# Patient Record
Sex: FEMALE | Race: OTHER | HISPANIC OR LATINO | URBAN - METROPOLITAN AREA
[De-identification: names, ages, dates, MRNs, and addresses within clinical notes are randomized per-mention and may not be internally consistent; named-entity substitution may affect disease eponyms.]

---

## 2020-09-15 ENCOUNTER — INPATIENT (INPATIENT)
Facility: HOSPITAL | Age: 35
LOS: 2 days | Discharge: HOME | DRG: 872 | End: 2020-09-18
Attending: INTERNAL MEDICINE | Admitting: INTERNAL MEDICINE
Payer: MEDICAID

## 2020-09-15 VITALS
DIASTOLIC BLOOD PRESSURE: 74 MMHG | WEIGHT: 143.08 LBS | RESPIRATION RATE: 20 BRPM | HEIGHT: 61 IN | OXYGEN SATURATION: 95 % | HEART RATE: 118 BPM | SYSTOLIC BLOOD PRESSURE: 116 MMHG | TEMPERATURE: 100 F

## 2020-09-15 DIAGNOSIS — N12 TUBULO-INTERSTITIAL NEPHRITIS, NOT SPECIFIED AS ACUTE OR CHRONIC: ICD-10-CM

## 2020-09-15 LAB
ALBUMIN SERPL ELPH-MCNC: 4.1 G/DL — SIGNIFICANT CHANGE UP (ref 3.3–5)
ALP SERPL-CCNC: 73 U/L — SIGNIFICANT CHANGE UP (ref 40–120)
ALT FLD-CCNC: 22 U/L — SIGNIFICANT CHANGE UP (ref 10–45)
ANION GAP SERPL CALC-SCNC: 14 MMOL/L — SIGNIFICANT CHANGE UP (ref 5–17)
ANISOCYTOSIS BLD QL: SLIGHT — SIGNIFICANT CHANGE UP
APPEARANCE UR: ABNORMAL
AST SERPL-CCNC: 22 U/L — SIGNIFICANT CHANGE UP (ref 10–40)
BACTERIA # UR AUTO: ABNORMAL
BASE EXCESS BLDV CALC-SCNC: -1.1 MMOL/L — SIGNIFICANT CHANGE UP (ref -2–2)
BASE EXCESS BLDV CALC-SCNC: 1.4 MMOL/L — SIGNIFICANT CHANGE UP (ref -2–2)
BASOPHILS # BLD AUTO: 0 K/UL — SIGNIFICANT CHANGE UP (ref 0–0.2)
BASOPHILS NFR BLD AUTO: 0 % — SIGNIFICANT CHANGE UP (ref 0–2)
BILIRUB SERPL-MCNC: 0.6 MG/DL — SIGNIFICANT CHANGE UP (ref 0.2–1.2)
BILIRUB UR-MCNC: NEGATIVE — SIGNIFICANT CHANGE UP
BUN SERPL-MCNC: 9 MG/DL — SIGNIFICANT CHANGE UP (ref 7–23)
CA-I SERPL-SCNC: 1.1 MMOL/L — LOW (ref 1.12–1.3)
CA-I SERPL-SCNC: 1.16 MMOL/L — SIGNIFICANT CHANGE UP (ref 1.12–1.3)
CALCIUM SERPL-MCNC: 9.8 MG/DL — SIGNIFICANT CHANGE UP (ref 8.4–10.5)
CHLORIDE BLDV-SCNC: 102 MMOL/L — SIGNIFICANT CHANGE UP (ref 96–108)
CHLORIDE BLDV-SCNC: 107 MMOL/L — SIGNIFICANT CHANGE UP (ref 96–108)
CHLORIDE SERPL-SCNC: 96 MMOL/L — SIGNIFICANT CHANGE UP (ref 96–108)
CO2 BLDV-SCNC: 24 MMOL/L — SIGNIFICANT CHANGE UP (ref 22–30)
CO2 BLDV-SCNC: 28 MMOL/L — SIGNIFICANT CHANGE UP (ref 22–30)
CO2 SERPL-SCNC: 24 MMOL/L — SIGNIFICANT CHANGE UP (ref 22–31)
COLOR SPEC: YELLOW — SIGNIFICANT CHANGE UP
CREAT SERPL-MCNC: 0.87 MG/DL — SIGNIFICANT CHANGE UP (ref 0.5–1.3)
DACRYOCYTES BLD QL SMEAR: SLIGHT — SIGNIFICANT CHANGE UP
DIFF PNL FLD: ABNORMAL
ELLIPTOCYTES BLD QL SMEAR: SLIGHT — SIGNIFICANT CHANGE UP
EOSINOPHIL # BLD AUTO: 0 K/UL — SIGNIFICANT CHANGE UP (ref 0–0.5)
EOSINOPHIL NFR BLD AUTO: 0 % — SIGNIFICANT CHANGE UP (ref 0–6)
EPI CELLS # UR: 2 /HPF — SIGNIFICANT CHANGE UP
GAS PNL BLDV: 133 MMOL/L — LOW (ref 135–145)
GAS PNL BLDV: 134 MMOL/L — LOW (ref 135–145)
GAS PNL BLDV: SIGNIFICANT CHANGE UP
GIANT PLATELETS BLD QL SMEAR: PRESENT — SIGNIFICANT CHANGE UP
GLUCOSE BLDV-MCNC: 94 MG/DL — SIGNIFICANT CHANGE UP (ref 70–99)
GLUCOSE BLDV-MCNC: 94 MG/DL — SIGNIFICANT CHANGE UP (ref 70–99)
GLUCOSE SERPL-MCNC: 100 MG/DL — HIGH (ref 70–99)
GLUCOSE UR QL: NEGATIVE — SIGNIFICANT CHANGE UP
HCG UR QL: NEGATIVE — SIGNIFICANT CHANGE UP
HCO3 BLDV-SCNC: 23 MMOL/L — SIGNIFICANT CHANGE UP (ref 21–29)
HCO3 BLDV-SCNC: 26 MMOL/L — SIGNIFICANT CHANGE UP (ref 21–29)
HCT VFR BLD CALC: 44.3 % — SIGNIFICANT CHANGE UP (ref 34.5–45)
HCT VFR BLDA CALC: 42 % — SIGNIFICANT CHANGE UP (ref 39–50)
HCT VFR BLDA CALC: 45 % — SIGNIFICANT CHANGE UP (ref 39–50)
HGB BLD CALC-MCNC: 13.7 G/DL — SIGNIFICANT CHANGE UP (ref 11.5–15.5)
HGB BLD CALC-MCNC: 14.5 G/DL — SIGNIFICANT CHANGE UP (ref 11.5–15.5)
HGB BLD-MCNC: 14.5 G/DL — SIGNIFICANT CHANGE UP (ref 11.5–15.5)
HIV 1 & 2 AB SERPL IA.RAPID: SIGNIFICANT CHANGE UP
HYALINE CASTS # UR AUTO: 0 /LPF — SIGNIFICANT CHANGE UP (ref 0–2)
KETONES UR-MCNC: ABNORMAL
LACTATE BLDV-MCNC: 1.9 MMOL/L — SIGNIFICANT CHANGE UP (ref 0.7–2)
LACTATE BLDV-MCNC: 2.5 MMOL/L — HIGH (ref 0.7–2)
LEUKOCYTE ESTERASE UR-ACNC: ABNORMAL
LIDOCAIN IGE QN: 38 U/L — SIGNIFICANT CHANGE UP (ref 7–60)
LYMPHOCYTES # BLD AUTO: 2.17 K/UL — SIGNIFICANT CHANGE UP (ref 1–3.3)
LYMPHOCYTES # BLD AUTO: 8.7 % — LOW (ref 13–44)
MACROCYTES BLD QL: SLIGHT — SIGNIFICANT CHANGE UP
MANUAL SMEAR VERIFICATION: SIGNIFICANT CHANGE UP
MCHC RBC-ENTMCNC: 28.9 PG — SIGNIFICANT CHANGE UP (ref 27–34)
MCHC RBC-ENTMCNC: 32.7 GM/DL — SIGNIFICANT CHANGE UP (ref 32–36)
MCV RBC AUTO: 88.4 FL — SIGNIFICANT CHANGE UP (ref 80–100)
MONOCYTES # BLD AUTO: 0.87 K/UL — SIGNIFICANT CHANGE UP (ref 0–0.9)
MONOCYTES NFR BLD AUTO: 3.5 % — SIGNIFICANT CHANGE UP (ref 2–14)
NEUTROPHILS # BLD AUTO: 21.88 K/UL — HIGH (ref 1.8–7.4)
NEUTROPHILS NFR BLD AUTO: 86.1 % — HIGH (ref 43–77)
NEUTS BAND # BLD: 1.7 % — SIGNIFICANT CHANGE UP (ref 0–8)
NITRITE UR-MCNC: POSITIVE
OTHER CELLS CSF MANUAL: 11 ML/DL — LOW (ref 16–22)
PCO2 BLDV: 38 MMHG — SIGNIFICANT CHANGE UP (ref 35–50)
PCO2 BLDV: 45 MMHG — SIGNIFICANT CHANGE UP (ref 35–50)
PH BLDV: 7.38 — SIGNIFICANT CHANGE UP (ref 7.35–7.45)
PH BLDV: 7.39 — SIGNIFICANT CHANGE UP (ref 7.35–7.45)
PH UR: 6.5 — SIGNIFICANT CHANGE UP (ref 5–8)
PLAT MORPH BLD: ABNORMAL
PLATELET # BLD AUTO: 275 K/UL — SIGNIFICANT CHANGE UP (ref 150–400)
PO2 BLDV: 20 MMHG — LOW (ref 25–45)
PO2 BLDV: 33 MMHG — SIGNIFICANT CHANGE UP (ref 25–45)
POIKILOCYTOSIS BLD QL AUTO: SLIGHT — SIGNIFICANT CHANGE UP
POLYCHROMASIA BLD QL SMEAR: SLIGHT — SIGNIFICANT CHANGE UP
POTASSIUM BLDV-SCNC: 3.3 MMOL/L — LOW (ref 3.5–5.3)
POTASSIUM BLDV-SCNC: 3.4 MMOL/L — LOW (ref 3.5–5.3)
POTASSIUM SERPL-MCNC: 3.4 MMOL/L — LOW (ref 3.5–5.3)
POTASSIUM SERPL-SCNC: 3.4 MMOL/L — LOW (ref 3.5–5.3)
PROT SERPL-MCNC: 7.7 G/DL — SIGNIFICANT CHANGE UP (ref 6–8.3)
PROT UR-MCNC: ABNORMAL
RBC # BLD: 5.01 M/UL — SIGNIFICANT CHANGE UP (ref 3.8–5.2)
RBC # FLD: 13.1 % — SIGNIFICANT CHANGE UP (ref 10.3–14.5)
RBC BLD AUTO: ABNORMAL
RBC CASTS # UR COMP ASSIST: 15 /HPF — HIGH (ref 0–4)
SAO2 % BLDV: 28 % — LOW (ref 67–88)
SAO2 % BLDV: 61 % — LOW (ref 67–88)
SARS-COV-2 RNA SPEC QL NAA+PROBE: SIGNIFICANT CHANGE UP
SODIUM SERPL-SCNC: 134 MMOL/L — LOW (ref 135–145)
SP GR SPEC: 1.02 — SIGNIFICANT CHANGE UP (ref 1.01–1.02)
UROBILINOGEN FLD QL: ABNORMAL
WBC # BLD: 24.92 K/UL — HIGH (ref 3.8–10.5)
WBC # FLD AUTO: 24.92 K/UL — HIGH (ref 3.8–10.5)
WBC UR QL: 83 /HPF — HIGH (ref 0–5)

## 2020-09-15 PROCEDURE — 74177 CT ABD & PELVIS W/CONTRAST: CPT | Mod: 26

## 2020-09-15 PROCEDURE — 93010 ELECTROCARDIOGRAM REPORT: CPT

## 2020-09-15 PROCEDURE — 71045 X-RAY EXAM CHEST 1 VIEW: CPT | Mod: 26

## 2020-09-15 PROCEDURE — 99285 EMERGENCY DEPT VISIT HI MDM: CPT

## 2020-09-15 RX ORDER — ACETAMINOPHEN 500 MG
2 TABLET ORAL
Qty: 0 | Refills: 0 | DISCHARGE

## 2020-09-15 RX ORDER — ACETAMINOPHEN 500 MG
975 TABLET ORAL ONCE
Refills: 0 | Status: COMPLETED | OUTPATIENT
Start: 2020-09-15 | End: 2020-09-15

## 2020-09-15 RX ORDER — POTASSIUM CHLORIDE 20 MEQ
10 PACKET (EA) ORAL ONCE
Refills: 0 | Status: COMPLETED | OUTPATIENT
Start: 2020-09-15 | End: 2020-09-15

## 2020-09-15 RX ORDER — SODIUM CHLORIDE 9 MG/ML
1000 INJECTION INTRAMUSCULAR; INTRAVENOUS; SUBCUTANEOUS
Refills: 0 | Status: DISCONTINUED | OUTPATIENT
Start: 2020-09-15 | End: 2020-09-16

## 2020-09-15 RX ORDER — SODIUM CHLORIDE 9 MG/ML
1000 INJECTION INTRAMUSCULAR; INTRAVENOUS; SUBCUTANEOUS ONCE
Refills: 0 | Status: COMPLETED | OUTPATIENT
Start: 2020-09-15 | End: 2020-09-15

## 2020-09-15 RX ORDER — NORETHINDRONE AND ETHINYL ESTRADIOL 0.4-0.035
1 KIT ORAL
Qty: 0 | Refills: 0 | DISCHARGE

## 2020-09-15 RX ORDER — KETOROLAC TROMETHAMINE 30 MG/ML
15 SYRINGE (ML) INJECTION ONCE
Refills: 0 | Status: DISCONTINUED | OUTPATIENT
Start: 2020-09-15 | End: 2020-09-15

## 2020-09-15 RX ORDER — PIPERACILLIN AND TAZOBACTAM 4; .5 G/20ML; G/20ML
3.38 INJECTION, POWDER, LYOPHILIZED, FOR SOLUTION INTRAVENOUS ONCE
Refills: 0 | Status: COMPLETED | OUTPATIENT
Start: 2020-09-15 | End: 2020-09-15

## 2020-09-15 RX ORDER — CEFTRIAXONE 500 MG/1
1000 INJECTION, POWDER, FOR SOLUTION INTRAMUSCULAR; INTRAVENOUS EVERY 24 HOURS
Refills: 0 | Status: DISCONTINUED | OUTPATIENT
Start: 2020-09-15 | End: 2020-09-18

## 2020-09-15 RX ADMIN — SODIUM CHLORIDE 100 MILLILITER(S): 9 INJECTION INTRAMUSCULAR; INTRAVENOUS; SUBCUTANEOUS at 21:41

## 2020-09-15 RX ADMIN — SODIUM CHLORIDE 2000 MILLILITER(S): 9 INJECTION INTRAMUSCULAR; INTRAVENOUS; SUBCUTANEOUS at 13:13

## 2020-09-15 RX ADMIN — Medication 10 MILLIEQUIVALENT(S): at 21:41

## 2020-09-15 RX ADMIN — SODIUM CHLORIDE 1000 MILLILITER(S): 9 INJECTION INTRAMUSCULAR; INTRAVENOUS; SUBCUTANEOUS at 12:23

## 2020-09-15 RX ADMIN — CEFTRIAXONE 100 MILLIGRAM(S): 500 INJECTION, POWDER, FOR SOLUTION INTRAMUSCULAR; INTRAVENOUS at 23:02

## 2020-09-15 RX ADMIN — Medication 975 MILLIGRAM(S): at 13:13

## 2020-09-15 RX ADMIN — Medication 975 MILLIGRAM(S): at 13:50

## 2020-09-15 RX ADMIN — Medication 15 MILLIGRAM(S): at 16:45

## 2020-09-15 RX ADMIN — PIPERACILLIN AND TAZOBACTAM 200 GRAM(S): 4; .5 INJECTION, POWDER, LYOPHILIZED, FOR SOLUTION INTRAVENOUS at 12:23

## 2020-09-15 NOTE — ED PROVIDER NOTE - OBJECTIVE STATEMENT
35F no pmhx p/w RLQ and fever x 2 days, had initial right flank pain 3 days ago, but then developed chills as well. No dysuria. No vaginal discharge. LMP 8/17. Sexually active with 1 partner no barrier contraception, denies risk of STDs. Takes OCPs. No cough/ URI symptoms. Vomited x 1 yesterday. Had constipation but took laxative and was able to have bowel movement today. No headache. No rash. 35F no pmhx p/w RLQ and fever x 2 days, had initial right flank pain 3 days ago, but then developed chills 2 days ago. No dysuria. No vaginal discharge. LMP 8/17. Sexually active with 1 partner no barrier contraception, denies risk of STDs. Takes OCPs. No cough/ URI symptoms. Vomited x 1 yesterday. Had constipation but took laxative and was able to have bowel movement today. No headache. No rash.

## 2020-09-15 NOTE — ED PROVIDER NOTE - PHYSICAL EXAMINATION
Gen: AAOx3, NAD  Head: NCAT  ENT: Airway patent, moist mucous membranes   Cardiac: tachycardia without murmurs   Respiratory: Lungs CTA B/L  Gastrointestinal:  Abdomen soft, mod RLQ TTP, nondistended, no rebound  : + CVA tenderness on Right   MSK: No gross abnormalities, FROM of all four extremities, no edema  HEME: Extremities warm, pulses intact and symmetrical in all four extremities  Skin: No rashes, no lesions  Neuro: No gross neurologic deficits, Gen: AAOx3, NAD  Head: NCAT  ENT: Airway patent, moist mucous membranes   Cardiac: tachycardia without murmurs   Respiratory: Lungs CTA B/L  Gastrointestinal:  Abdomen soft, mod RLQ TTP, nondistended, no rebound  : + CVA tenderness on Right; pelvic exam chaperoned by Lanette Trinidad RN - normal vaginal discharge, scant clear fluid, no Cervical motion tenderness, no adnexal ttp.   MSK: No gross abnormalities, FROM of all four extremities, no edema  HEME: Extremities warm, pulses intact and symmetrical in all four extremities  Skin: No rashes, no lesions  Neuro: No gross neurologic deficits, Gen: AAOx3, NAD  Head: NCAT  ENT: Airway patent, moist mucous membranes   Cardiac: tachycardia without murmurs   Respiratory: Lungs CTA B/L  Gastrointestinal:  Abdomen soft, mod RLQ TTP, nondistended, no rebound  : + CVA tenderness on Right; pelvic exam chaperoned by Lanette Trinidad RN - normal vaginal discharge, scant clear fluid, no Cervical motion tenderness, no adnexal ttp.   MSK: No gross abnormalities, FROM of all four extremities, no edema  HEME: Extremities warm, pulses intact and symmetrical in all four extremities  Skin: No rashes, no lesions  Neuro: No gross neurologic deficits, normal gait

## 2020-09-15 NOTE — CHART NOTE - NSCHARTNOTEFT_GEN_A_CORE
Medicine NP Sepsis reevaluation note    Vital Signs Last 24 Hrs  T(C): 36.9 (15 Sep 2020 19:00), Max: 39.1 (15 Sep 2020 16:16)  T(F): 98.4 (15 Sep 2020 19:00), Max: 102.4 (15 Sep 2020 16:16)  HR: 115 (15 Sep 2020 19:00) (115 - 125)  BP: 103/68 (15 Sep 2020 19:00) (103/68 - 133/85)  BP(mean): --  RR: 18 (15 Sep 2020 19:00) (16 - 20)  SpO2: 98% (15 Sep 2020 19:00) (95% - 100%)    		  LUNGS:  CTA B/L  HEART: RRR, tachycardiac  CAPILLARY REFiLL:  	Fingers: [ x ] less than 2 seconds                                            Toes: [x  ]  less than 2 seconds  PERIPHERAL PULSES:  Radial: [  ] Palpable  [  x]  non-palpable                                         Dorsalis Pedis: [  x] Palpable  [  ] non-palpable                                         Posterior Tibial: [ x ] Palpable  [  ] non-palpable                                          Other:  SKIN:   [  ]  Diaphoretic  [  ]  mottling  [  ]  Cold extremities  [ x ]  Warm [x  ]  Dry                      Other:                            14.5   24.92 )-----------( 275      ( 15 Sep 2020 12:38 )             44.3       Blood Gas Venous - Lactate (09.15.20 @ 16:10)    Blood Gas Venous - Lactate: 1.9 mmoL/L  A/P     35F no pmhx p/w RLQ and fever x 2 days, had initial right flank pain 3 days ago, but then developed chills 2 days ago. Admitted with Sepsis sec to [facility]  s/p 2L NS iv bolus in the ED  S/P Zosyn in the ED  Afebrile now, not hypotensive, Hr in low 100;s to 110;s  C/W NS@ 100 ml/hr  Vitals q4 hourly  H&P pending  Will continue to monitor    Eric juarez [facility] [place]  [id number]

## 2020-09-15 NOTE — ED ADULT NURSE NOTE - OBJECTIVE STATEMENT
36 y/o Female no significant pmh presenting to ED c/o RLQ pain associated with subjective fevers and chills x the passed 2 days. pt states that she began having RLQ pain 3 days ago that has not subsided and is now experiencing chills and was febrile this am to 101F orally. states she last took Tylenol at 2 am this morning. oral temp of 100.1F orally upon assessment. HR tachy to 118, bp stable. denies any weakness, dizziness, nausea, vomiting, urinary s/s, body aches, sick contacts, or recent travel. labs and line obtained with blood cultures x 2, safety maintained, call bell at the bedside and within reach. pt pending ct scan of abdomen.

## 2020-09-15 NOTE — ED PROVIDER NOTE - NS ED ROS FT
Gen: + fever and decreased appetite  Eyes: No eye irritation or discharge  ENT: No ear pain, congestion, sore throat  Resp: No cough or trouble breathing  Cardiovascular: No chest pain or palpitation  Gastroenteric: + vomiting, + abd pain   :  No change in urine output; no dysuria, + rigth flank pain   MS: No joint or muscle pain  Skin: No rashes  Neuro: No headache; no abnormal movements  Remainder negative, except as per the HPI Gen: + fever and decreased appetite  Eyes: No eye irritation or discharge  ENT: No ear pain, congestion, sore throat  Resp: No cough or trouble breathing  Cardiovascular: No chest pain or palpitation  Gastroenteric: + vomiting, + abd pain   :  No change in urine output; no dysuria, + right flank pain   MS: No joint or muscle pain  Skin: No rashes  Neuro: No headache; no abnormal movements  Remainder negative, except as per the HPI

## 2020-09-15 NOTE — H&P ADULT - NSHPLABSRESULTS_GEN_ALL_CORE
14.5   24.92 )-----------( 275      ( 15 Sep 2020 12:38 )             44.3     09-15    134<L>  |  96  |  9   ----------------------------<  100<H>  3.4<L>   |  24  |  0.87    Ca    9.8      15 Sep 2020 12:38    TPro  7.7  /  Alb  4.1  /  TBili  0.6  /  DBili  x   /  AST  22  /  ALT  22  /  AlkPhos  73  09-15

## 2020-09-15 NOTE — ED PROVIDER NOTE - PROGRESS NOTE DETAILS
Saqib SPAULDING PGY-1: pt states that her pain has improved. Pt resting comfortably in bed. pt reassessed, still tachycardic, informed of admission for sepsis 2/2 to pyelo, extremities well perfused, cap refill < 2 sec, plan for admission for abnormal vitals in setting of sepsis, well appearing though, stable for floor. Spoke to pt's pcp's and they said they do not admit to anyone in particular. Fu DO: pt reassessed, s/p 30 cc/kg IBW-  still tachycardic, started maintenance IVF. Pt informed of admission for sepsis 2/2 to pyelo, extremities well perfused, cap refill < 2 sec, plan for admission for abnormal vitals in setting of sepsis, clinically well appearing, stable for floor.

## 2020-09-15 NOTE — ED PROVIDER NOTE - CLINICAL SUMMARY MEDICAL DECISION MAKING FREE TEXT BOX
Fu DO: 35F tachycardia, fever, RLQ abd pain reproducible on exam, concern for pyelo vs appendicitis, less likely septic stone, pt overall well appearing despite abnormal vitals, will dose empiric abx and gentle fluids as pt appears hydrated, ct a/p to eval for intraabd infection, pelvic exam, no chest pain or URI symptoms, no signs of meningismus. Fu DO: 35F tachycardia, fever, RLQ abd pain reproducible on exam, concern for pyelo vs appendicitis, less likely septic stone, pt overall well appearing despite abnormal vitals, will dose empiric abx and gentle fluids as pt appears dehydrated, if leukocytosis or lactate on labs then will admin 30 cc/kg per sepsis bundle, will obtain ct a/p to eval for intraabd infection. Pelvic exam wnl. No chest pain or URI symptoms, no signs of meningismus. screening ekg and cxr to eval for pna though less likely.

## 2020-09-15 NOTE — ED PROVIDER NOTE - CARE PLAN
Principal Discharge DX:	Pyelonephritis  Secondary Diagnosis:	Sepsis, due to unspecified organism, unspecified whether acute organ dysfunction present

## 2020-09-15 NOTE — H&P ADULT - HISTORY OF PRESENT ILLNESS
35F no pmhx p/w RLQ and fever x 2 days, had initial right flank pain 3 days ago, but then developed chills 2 days ago. No dysuria. No vaginal discharge. LMP 8/17. Sexually active with 1 partner no barrier contraception, denies risk of STDs. Takes OCPs. No cough/ URI symptoms. Vomited x 1 yesterday. Had constipation but took laxative and was able to have bowel movement today. No headache. No rash.

## 2020-09-15 NOTE — H&P ADULT - NSHPPHYSICALEXAM_GEN_ALL_CORE
pt. seen and examined, feeling better     Vital Signs Last 24 Hrs  T(C): 38.2 (16 Sep 2020 01:52), Max: 39.1 (15 Sep 2020 16:16)  T(F): 100.7 (16 Sep 2020 01:52), Max: 102.4 (15 Sep 2020 16:16)  HR: 111 (16 Sep 2020 01:52) (111 - 125)  BP: 101/67 (16 Sep 2020 01:52) (101/67 - 133/85)  BP(mean): --  RR: 18 (16 Sep 2020 01:52) (16 - 20)  SpO2: 100% (16 Sep 2020 01:52) (95% - 100%)    heent: nc/at, no pallor  neck: supple, no JVD  lungs: B/L clear, no w/r/r  heart: s1s2 nml  abd: soft, NABS, NT/ND  ext: no e/c/c, pulses 2+  neuro: aaox3 , no focal deficit

## 2020-09-15 NOTE — ED ADULT NURSE REASSESSMENT NOTE - NS ED NURSE REASSESS COMMENT FT1
pt admitted to hospital for pyelo associated with tachycardia despite fluids. pt administered toradol 15 mg IVP for fever of 102.4F orally upon reassessment, pt denies feeling febrile. denies any chills, weakness, cp, sob, abd pain, or any other complaints of pain or discomfort. pts temp resolved with toradol 15mg IV and pt currently afebrile with oral temp pf 97.8F, pt remains tachy despite 2 L of NS, tachy to 114. repeat lactate improved with repeat result of 1.9 from 2.5. report given to 6 lawrence RN, pt pending transport. safety maintained.

## 2020-09-15 NOTE — H&P ADULT - ATTENDING COMMENTS
advance care planning : pt. remain full code. d/w patinet regarding CPR, intubation , chest compression, she agrees for everything . time spend 15 min

## 2020-09-16 DIAGNOSIS — A41.9 SEPSIS, UNSPECIFIED ORGANISM: ICD-10-CM

## 2020-09-16 DIAGNOSIS — N12 TUBULO-INTERSTITIAL NEPHRITIS, NOT SPECIFIED AS ACUTE OR CHRONIC: ICD-10-CM

## 2020-09-16 LAB
-  CANDIDA ALBICANS: SIGNIFICANT CHANGE UP
-  CANDIDA GLABRATA: SIGNIFICANT CHANGE UP
-  CANDIDA KRUSEI: SIGNIFICANT CHANGE UP
-  CANDIDA PARAPSILOSIS: SIGNIFICANT CHANGE UP
-  CANDIDA TROPICALIS: SIGNIFICANT CHANGE UP
-  COAGULASE NEGATIVE STAPHYLOCOCCUS: SIGNIFICANT CHANGE UP
-  K. PNEUMONIAE GROUP: SIGNIFICANT CHANGE UP
-  KPC RESISTANCE GENE: SIGNIFICANT CHANGE UP
-  STREPTOCOCCUS SP. (NOT GRP A, B OR S PNEUMONIAE): SIGNIFICANT CHANGE UP
A BAUMANNII DNA SPEC QL NAA+PROBE: SIGNIFICANT CHANGE UP
ALBUMIN SERPL ELPH-MCNC: 3.3 G/DL — SIGNIFICANT CHANGE UP (ref 3.3–5)
ALP SERPL-CCNC: 70 U/L — SIGNIFICANT CHANGE UP (ref 40–120)
ALT FLD-CCNC: 24 U/L — SIGNIFICANT CHANGE UP (ref 10–45)
ANION GAP SERPL CALC-SCNC: 13 MMOL/L — SIGNIFICANT CHANGE UP (ref 5–17)
AST SERPL-CCNC: 20 U/L — SIGNIFICANT CHANGE UP (ref 10–40)
BILIRUB SERPL-MCNC: 0.4 MG/DL — SIGNIFICANT CHANGE UP (ref 0.2–1.2)
BUN SERPL-MCNC: 8 MG/DL — SIGNIFICANT CHANGE UP (ref 7–23)
CALCIUM SERPL-MCNC: 8.8 MG/DL — SIGNIFICANT CHANGE UP (ref 8.4–10.5)
CHLORIDE SERPL-SCNC: 105 MMOL/L — SIGNIFICANT CHANGE UP (ref 96–108)
CO2 SERPL-SCNC: 20 MMOL/L — LOW (ref 22–31)
CREAT SERPL-MCNC: 0.7 MG/DL — SIGNIFICANT CHANGE UP (ref 0.5–1.3)
E CLOAC COMP DNA BLD POS QL NAA+PROBE: SIGNIFICANT CHANGE UP
E COLI DNA BLD POS QL NAA+NON-PROBE: SIGNIFICANT CHANGE UP
ENTEROCOC DNA BLD POS QL NAA+NON-PROBE: SIGNIFICANT CHANGE UP
ENTEROCOC DNA BLD POS QL NAA+NON-PROBE: SIGNIFICANT CHANGE UP
GLUCOSE SERPL-MCNC: 107 MG/DL — HIGH (ref 70–99)
GP B STREP DNA BLD POS QL NAA+NON-PROBE: SIGNIFICANT CHANGE UP
GRAM STN FLD: SIGNIFICANT CHANGE UP
HAEM INFLU DNA BLD POS QL NAA+NON-PROBE: SIGNIFICANT CHANGE UP
HCT VFR BLD CALC: 40.5 % — SIGNIFICANT CHANGE UP (ref 34.5–45)
HGB BLD-MCNC: 13.1 G/DL — SIGNIFICANT CHANGE UP (ref 11.5–15.5)
K OXYTOCA DNA BLD POS QL NAA+NON-PROBE: SIGNIFICANT CHANGE UP
L MONOCYTOG DNA BLD POS QL NAA+NON-PROBE: SIGNIFICANT CHANGE UP
MCHC RBC-ENTMCNC: 28.7 PG — SIGNIFICANT CHANGE UP (ref 27–34)
MCHC RBC-ENTMCNC: 32.3 GM/DL — SIGNIFICANT CHANGE UP (ref 32–36)
MCV RBC AUTO: 88.6 FL — SIGNIFICANT CHANGE UP (ref 80–100)
METHOD TYPE: SIGNIFICANT CHANGE UP
MRSA SPEC QL CULT: SIGNIFICANT CHANGE UP
MSSA DNA SPEC QL NAA+PROBE: SIGNIFICANT CHANGE UP
N MEN ISLT CULT: SIGNIFICANT CHANGE UP
NRBC # BLD: 0 /100 WBCS — SIGNIFICANT CHANGE UP (ref 0–0)
P AERUGINOSA DNA BLD POS NAA+NON-PROBE: SIGNIFICANT CHANGE UP
PLATELET # BLD AUTO: 266 K/UL — SIGNIFICANT CHANGE UP (ref 150–400)
POTASSIUM SERPL-MCNC: 3.5 MMOL/L — SIGNIFICANT CHANGE UP (ref 3.5–5.3)
POTASSIUM SERPL-SCNC: 3.5 MMOL/L — SIGNIFICANT CHANGE UP (ref 3.5–5.3)
PROT SERPL-MCNC: 6.5 G/DL — SIGNIFICANT CHANGE UP (ref 6–8.3)
RBC # BLD: 4.57 M/UL — SIGNIFICANT CHANGE UP (ref 3.8–5.2)
RBC # FLD: 13.1 % — SIGNIFICANT CHANGE UP (ref 10.3–14.5)
S MARCESCENS DNA BLD POS NAA+NON-PROBE: SIGNIFICANT CHANGE UP
S PNEUM DNA BLD POS QL NAA+NON-PROBE: SIGNIFICANT CHANGE UP
S PYO DNA BLD POS QL NAA+NON-PROBE: SIGNIFICANT CHANGE UP
SODIUM SERPL-SCNC: 138 MMOL/L — SIGNIFICANT CHANGE UP (ref 135–145)
SPECIMEN SOURCE: SIGNIFICANT CHANGE UP
WBC # BLD: 20.03 K/UL — HIGH (ref 3.8–10.5)
WBC # FLD AUTO: 20.03 K/UL — HIGH (ref 3.8–10.5)

## 2020-09-16 PROCEDURE — 99223 1ST HOSP IP/OBS HIGH 75: CPT

## 2020-09-16 PROCEDURE — 99231 SBSQ HOSP IP/OBS SF/LOW 25: CPT

## 2020-09-16 RX ORDER — SODIUM CHLORIDE 9 MG/ML
500 INJECTION INTRAMUSCULAR; INTRAVENOUS; SUBCUTANEOUS ONCE
Refills: 0 | Status: COMPLETED | OUTPATIENT
Start: 2020-09-16 | End: 2020-09-16

## 2020-09-16 RX ORDER — POLYETHYLENE GLYCOL 3350 17 G/17G
17 POWDER, FOR SOLUTION ORAL DAILY
Refills: 0 | Status: DISCONTINUED | OUTPATIENT
Start: 2020-09-16 | End: 2020-09-18

## 2020-09-16 RX ORDER — ACETAMINOPHEN 500 MG
1000 TABLET ORAL ONCE
Refills: 0 | Status: COMPLETED | OUTPATIENT
Start: 2020-09-16 | End: 2020-09-16

## 2020-09-16 RX ORDER — ACETAMINOPHEN 500 MG
650 TABLET ORAL EVERY 6 HOURS
Refills: 0 | Status: DISCONTINUED | OUTPATIENT
Start: 2020-09-16 | End: 2020-09-18

## 2020-09-16 RX ORDER — SODIUM CHLORIDE 9 MG/ML
1000 INJECTION INTRAMUSCULAR; INTRAVENOUS; SUBCUTANEOUS
Refills: 0 | Status: DISCONTINUED | OUTPATIENT
Start: 2020-09-16 | End: 2020-09-18

## 2020-09-16 RX ORDER — PANTOPRAZOLE SODIUM 20 MG/1
40 TABLET, DELAYED RELEASE ORAL
Refills: 0 | Status: DISCONTINUED | OUTPATIENT
Start: 2020-09-16 | End: 2020-09-18

## 2020-09-16 RX ORDER — INFLUENZA VIRUS VACCINE 15; 15; 15; 15 UG/.5ML; UG/.5ML; UG/.5ML; UG/.5ML
0.5 SUSPENSION INTRAMUSCULAR ONCE
Refills: 0 | Status: DISCONTINUED | OUTPATIENT
Start: 2020-09-16 | End: 2020-09-18

## 2020-09-16 RX ADMIN — Medication 650 MILLIGRAM(S): at 11:03

## 2020-09-16 RX ADMIN — SODIUM CHLORIDE 125 MILLILITER(S): 9 INJECTION INTRAMUSCULAR; INTRAVENOUS; SUBCUTANEOUS at 06:37

## 2020-09-16 RX ADMIN — PANTOPRAZOLE SODIUM 40 MILLIGRAM(S): 20 TABLET, DELAYED RELEASE ORAL at 05:23

## 2020-09-16 RX ADMIN — POLYETHYLENE GLYCOL 3350 17 GRAM(S): 17 POWDER, FOR SOLUTION ORAL at 08:27

## 2020-09-16 RX ADMIN — Medication 1000 MILLIGRAM(S): at 01:00

## 2020-09-16 RX ADMIN — SODIUM CHLORIDE 500 MILLILITER(S): 9 INJECTION INTRAMUSCULAR; INTRAVENOUS; SUBCUTANEOUS at 00:43

## 2020-09-16 RX ADMIN — Medication 650 MILLIGRAM(S): at 11:15

## 2020-09-16 RX ADMIN — CEFTRIAXONE 100 MILLIGRAM(S): 500 INJECTION, POWDER, FOR SOLUTION INTRAMUSCULAR; INTRAVENOUS at 22:19

## 2020-09-16 RX ADMIN — Medication 400 MILLIGRAM(S): at 00:07

## 2020-09-16 RX ADMIN — SODIUM CHLORIDE 500 MILLILITER(S): 9 INJECTION INTRAMUSCULAR; INTRAVENOUS; SUBCUTANEOUS at 06:52

## 2020-09-16 NOTE — PROVIDER CONTACT NOTE (CRITICAL VALUE NOTIFICATION) - BACKGROUND
35 y.o. female no pmhx p/w RLQ and fever x 2 days, initial right flank pain 3 days ago but then developed chills 2 days ago. admitted with sepsis sec to [facility]

## 2020-09-16 NOTE — PROVIDER CONTACT NOTE (CRITICAL VALUE NOTIFICATION) - ASSESSMENT
/68, T 99 oral, P 115, RR 18, 02 98 on room air. Pt denies any palpitations, chest pain, dizziness. Pt on  ml/hr. Tolerating fluids with no signs of distress.

## 2020-09-16 NOTE — CONSULT NOTE ADULT - SUBJECTIVE AND OBJECTIVE BOX
"HPI: 35F no pmhx p/w RLQ and fever x 2 days, had initial right flank pain 3 days ago, but then developed chills 2 days ago. No dysuria. No vaginal discharge. LMP . Sexually active with 1 partner no barrier contraception, denies risk of STDs. Takes OCPs. No cough/ URI symptoms. Vomited x 1 yesterday. Had constipation but took laxative and was able to have bowel movement today. No headache. No rash. (15 Sep 2020 17:35)"    Above reviewed. Saw/spoke to patient. Patient initially presenting with fever, RLQ pain. Patient does not have history of frequent UTIs. Last abx was amoxicillin. No chest pain, no SOB. No abd pain. No N/V/D. No bladder pain. No sob, cough. ID called for further eval, pyelo, positive BCX.    PAST MEDICAL & SURGICAL HISTORY:  No pertinent past medical history    No significant past surgical history    Allergies    No Known Allergies    ANTIMICROBIALS:  cefTRIAXone   IVPB 1000 every 24 hours    OTHER MEDS:  acetaminophen   Tablet .. 650 milliGRAM(s) Oral every 6 hours PRN  influenza   Vaccine 0.5 milliLiter(s) IntraMuscular once  pantoprazole    Tablet 40 milliGRAM(s) Oral before breakfast  polyethylene glycol 3350 17 Gram(s) Oral daily  sodium chloride 0.9%. 1000 milliLiter(s) IV Continuous <Continuous>    SOCIAL HISTORY: No tobacco, no alcohol, no illicit drugs    FAMILY HISTORY: No pertinent family history relating to chief complaint    Drug Dosing Weight  Height (cm): 154.9 (15 Sep 2020 11:14)  Weight (kg): 64.9 (15 Sep 2020 11:14)  BMI (kg/m2): 27 (15 Sep 2020 11:14)  BSA (m2): 1.64 (15 Sep 2020 11:14)    PE:    Vital Signs Last 24 Hrs  T(C): 36.9 (16 Sep 2020 11:47), Max: 38.7 (15 Sep 2020 23:57)  T(F): 98.5 (16 Sep 2020 11:47), Max: 101.7 (15 Sep 2020 23:57)  HR: 110 (16 Sep 2020 11:47) (110 - 119)  BP: 117/78 (16 Sep 2020 11:47) (101/67 - 117/78)  RR: 18 (16 Sep 2020 11:47) (18 - 18)  SpO2: 97% (16 Sep 2020 11:47) (97% - 100%)    Gen: AOx3, NAD, non-toxic  CV: S1+S2 normal, tachycardic  Resp: Clear bilat, no resp distress, no crackles/wheezes  Abd: Soft, nontender, +BS  Ext: No LE edema, no wounds  : No suprapubic tenderness  IV/Skin: No thrombophlebitis  Msk: No low back pain, no arthralgias, no joint swelling  Neuro: No sensory deficits, no motor deficits    LABS:                        13.1   20.03 )-----------( 266      ( 16 Sep 2020 06:13 )             40.5         138  |  105  |  8   ----------------------------<  107<H>  3.5   |  20<L>  |  0.70    Ca    8.8      16 Sep 2020 06:13    TPro  6.5  /  Alb  3.3  /  TBili  0.4  /  DBili  x   /  AST  20  /  ALT  24  /  AlkPhos  70      Urinalysis Basic - ( 15 Sep 2020 12:16 )    Color: Yellow / Appearance: Slightly Turbid / S.018 / pH: x  Gluc: x / Ketone: Small  / Bili: Negative / Urobili: 2 mg/dL   Blood: x / Protein: 30 mg/dL / Nitrite: Positive   Leuk Esterase: Large / RBC: 15 /hpf / WBC 83 /HPF   Sq Epi: x / Non Sq Epi: 2 /hpf / Bacteria: Many    MICROBIOLOGY:    .Blood Blood-Peripheral  09-15-20   Growth in aerobic bottle: Gram Variable Rods  "Due to technical problems, Proteus sp. will Not be reported as part of  the BCID panel until further notice"    (otherwise reviewed)    RADIOLOGY:    9/15 CT:    IMPRESSION:    Findings suggestive of right polynephritis and proximal ureteritis.

## 2020-09-16 NOTE — PROGRESS NOTE ADULT - SUBJECTIVE AND OBJECTIVE BOX
Patient is a 35y old  Female who presents with a chief complaint of flank pain / fever (16 Sep 2020 16:37)      INTERVAL HPI/OVERNIGHT EVENTS:  T(C): 36.9 (20 @ 11:47), Max: 38.7 (09-15-20 @ 23:57)  HR: 110 (20 @ 11:47) (110 - 119)  BP: 117/78 (20 @ 11:47) (101/67 - 117/78)  RR: 18 (20 @ 11:47) (18 - 18)  SpO2: 97% (20 @ 11:47) (97% - 100%)  Wt(kg): --  I&O's Summary    15 Sep 2020 07:  -  16 Sep 2020 07:00  --------------------------------------------------------  IN: 500 mL / OUT: 1000 mL / NET: -500 mL    16 Sep 2020 07:  -  16 Sep 2020 19:16  --------------------------------------------------------  IN: 2035 mL / OUT: 1100 mL / NET: 935 mL        PAST MEDICAL & SURGICAL HISTORY:  No pertinent past medical history    No significant past surgical history        SOCIAL HISTORY  Alcohol:  Tobacco:  Illicit substance use:    FAMILY HISTORY:    REVIEW OF SYSTEMS:  CONSTITUTIONAL: No fever, weight loss, or fatigue  EYES: No eye pain, visual disturbances, or discharge  ENMT:  No difficulty hearing, tinnitus, vertigo; No sinus or throat pain  NECK: No pain or stiffness  RESPIRATORY: No cough, wheezing, chills or hemoptysis; No shortness of breath  CARDIOVASCULAR: No chest pain, palpitations, dizziness, or leg swelling  GASTROINTESTINAL: No abdominal or epigastric pain. No nausea, vomiting, or hematemesis; No diarrhea or constipation. No melena or hematochezia.  GENITOURINARY: No dysuria, frequency, hematuria, or incontinence  NEUROLOGICAL: No headaches, memory loss, loss of strength, numbness, or tremors  SKIN: No itching, burning, rashes, or lesions   LYMPH NODES: No enlarged glands  ENDOCRINE: No heat or cold intolerance; No hair loss  MUSCULOSKELETAL: No joint pain or swelling; No muscle, back, or extremity pain  PSYCHIATRIC: No depression, anxiety, mood swings, or difficulty sleeping  HEME/LYMPH: No easy bruising, or bleeding gums  ALLERY AND IMMUNOLOGIC: No hives or eczema    RADIOLOGY & ADDITIONAL TESTS:    Imaging Personally Reviewed:  [ ] YES  [ ] NO    Consultant(s) Notes Reviewed:  [ ] YES  [ ] NO    PHYSICAL EXAM:  GENERAL: NAD, well-groomed, well-developed  HEAD:  Atraumatic, Normocephalic  EYES: EOMI, PERRLA, conjunctiva and sclera clear  ENMT: No tonsillar erythema, exudates, or enlargement; Moist mucous membranes, Good dentition, No lesions  NECK: Supple, No JVD, Normal thyroid  NERVOUS SYSTEM:  Alert & Oriented X3, Good concentration; Motor Strength 5/5 B/L upper and lower extremities; DTRs 2+ intact and symmetric  CHEST/LUNG: Clear to percussion bilaterally; No rales, rhonchi, wheezing, or rubs  HEART: Regular rate and rhythm; No murmurs, rubs, or gallops  ABDOMEN: Soft, Nontender, Nondistended; Bowel sounds present  EXTREMITIES:  2+ Peripheral Pulses, No clubbing, cyanosis, or edema  LYMPH: No lymphadenopathy noted  SKIN: No rashes or lesions    LABS:                        13.1   20.03 )-----------( 266      ( 16 Sep 2020 06:13 )             40.5         138  |  105  |  8   ----------------------------<  107<H>  3.5   |  20<L>  |  0.70    Ca    8.8      16 Sep 2020 06:13    TPro  6.5  /  Alb  3.3  /  TBili  0.4  /  DBili  x   /  AST  20  /  ALT  24  /  AlkPhos  70  -      Urinalysis Basic - ( 15 Sep 2020 12:16 )    Color: Yellow / Appearance: Slightly Turbid / S.018 / pH: x  Gluc: x / Ketone: Small  / Bili: Negative / Urobili: 2 mg/dL   Blood: x / Protein: 30 mg/dL / Nitrite: Positive   Leuk Esterase: Large / RBC: 15 /hpf / WBC 83 /HPF   Sq Epi: x / Non Sq Epi: 2 /hpf / Bacteria: Many      CAPILLARY BLOOD GLUCOSE            Urinalysis Basic - ( 15 Sep 2020 12:16 )    Color: Yellow / Appearance: Slightly Turbid / S.018 / pH: x  Gluc: x / Ketone: Small  / Bili: Negative / Urobili: 2 mg/dL   Blood: x / Protein: 30 mg/dL / Nitrite: Positive   Leuk Esterase: Large / RBC: 15 /hpf / WBC 83 /HPF   Sq Epi: x / Non Sq Epi: 2 /hpf / Bacteria: Many        MEDICATIONS  (STANDING):  cefTRIAXone   IVPB 1000 milliGRAM(s) IV Intermittent every 24 hours  influenza   Vaccine 0.5 milliLiter(s) IntraMuscular once  pantoprazole    Tablet 40 milliGRAM(s) Oral before breakfast  polyethylene glycol 3350 17 Gram(s) Oral daily  sodium chloride 0.9%. 1000 milliLiter(s) (125 mL/Hr) IV Continuous <Continuous>    MEDICATIONS  (PRN):  acetaminophen   Tablet .. 650 milliGRAM(s) Oral every 6 hours PRN Temp greater or equal to 38C (100.4F), Moderate Pain (4 - 6)      Care Discussed with Consultants/Other Providers [ ] YES  [ ] NO Patient is a 35y old  Female who presents with a chief complaint of flank pain / fever (16 Sep 2020 16:37)    pt. seen and examined, blood c/s pos   INTERVAL HPI/OVERNIGHT EVENTS:  T(C): 36.9 (20 @ 11:47), Max: 38.7 (09-15-20 @ 23:57)  HR: 110 (20 @ 11:47) (110 - 119)  BP: 117/78 (20 @ 11:47) (101/67 - 117/78)  RR: 18 (20 @ 11:47) (18 - 18)  SpO2: 97% (20 @ 11:47) (97% - 100%)  Wt(kg): --  I&O's Summary    15 Sep 2020 07:  -  16 Sep 2020 07:00  --------------------------------------------------------  IN: 500 mL / OUT: 1000 mL / NET: -500 mL    16 Sep 2020 07:  -  16 Sep 2020 19:16  --------------------------------------------------------  IN: 2035 mL / OUT: 1100 mL / NET: 935 mL        PAST MEDICAL & SURGICAL HISTORY:  No pertinent past medical history    No significant past surgical history        SOCIAL HISTORY  Alcohol:  Tobacco:  Illicit substance use:    FAMILY HISTORY:    REVIEW OF SYSTEMS:  CONSTITUTIONAL: No fever, weight loss, or fatigue  EYES: No eye pain, visual disturbances, or discharge  ENMT:  No difficulty hearing, tinnitus, vertigo; No sinus or throat pain  NECK: No pain or stiffness  RESPIRATORY: No cough, wheezing, chills or hemoptysis; No shortness of breath  CARDIOVASCULAR: No chest pain, palpitations, dizziness, or leg swelling  GASTROINTESTINAL: No abdominal or epigastric pain. No nausea, vomiting, or hematemesis; No diarrhea or constipation. No melena or hematochezia.  GENITOURINARY: No dysuria, frequency, hematuria, or incontinence  NEUROLOGICAL: No headaches, memory loss, loss of strength, numbness, or tremors  SKIN: No itching, burning, rashes, or lesions   LYMPH NODES: No enlarged glands  ENDOCRINE: No heat or cold intolerance; No hair loss  MUSCULOSKELETAL: No joint pain or swelling; No muscle, back, or extremity pain  PSYCHIATRIC: No depression, anxiety, mood swings, or difficulty sleeping  HEME/LYMPH: No easy bruising, or bleeding gums  ALLERY AND IMMUNOLOGIC: No hives or eczema    RADIOLOGY & ADDITIONAL TESTS:    Imaging Personally Reviewed:  [ ] YES  [ ] NO    Consultant(s) Notes Reviewed:  [ ] YES  [ ] NO    PHYSICAL EXAM:  GENERAL: NAD, well-groomed, well-developed  HEAD:  Atraumatic, Normocephalic  EYES: EOMI, PERRLA, conjunctiva and sclera clear  ENMT: No tonsillar erythema, exudates, or enlargement; Moist mucous membranes, Good dentition, No lesions  NECK: Supple, No JVD, Normal thyroid  NERVOUS SYSTEM:  Alert & Oriented X3, Good concentration; Motor Strength 5/5 B/L upper and lower extremities; DTRs 2+ intact and symmetric  CHEST/LUNG: Clear to percussion bilaterally; No rales, rhonchi, wheezing, or rubs  HEART: Regular rate and rhythm; No murmurs, rubs, or gallops  ABDOMEN: Soft, Nontender, Nondistended; Bowel sounds present  EXTREMITIES:  2+ Peripheral Pulses, No clubbing, cyanosis, or edema  LYMPH: No lymphadenopathy noted  SKIN: No rashes or lesions    LABS:                        13.1   20.03 )-----------( 266      ( 16 Sep 2020 06:13 )             40.5         138  |  105  |  8   ----------------------------<  107<H>  3.5   |  20<L>  |  0.70    Ca    8.8      16 Sep 2020 06:13    TPro  6.5  /  Alb  3.3  /  TBili  0.4  /  DBili  x   /  AST  20  /  ALT  24  /  AlkPhos  70  -16      Urinalysis Basic - ( 15 Sep 2020 12:16 )    Color: Yellow / Appearance: Slightly Turbid / S.018 / pH: x  Gluc: x / Ketone: Small  / Bili: Negative / Urobili: 2 mg/dL   Blood: x / Protein: 30 mg/dL / Nitrite: Positive   Leuk Esterase: Large / RBC: 15 /hpf / WBC 83 /HPF   Sq Epi: x / Non Sq Epi: 2 /hpf / Bacteria: Many      CAPILLARY BLOOD GLUCOSE            Urinalysis Basic - ( 15 Sep 2020 12:16 )    Color: Yellow / Appearance: Slightly Turbid / S.018 / pH: x  Gluc: x / Ketone: Small  / Bili: Negative / Urobili: 2 mg/dL   Blood: x / Protein: 30 mg/dL / Nitrite: Positive   Leuk Esterase: Large / RBC: 15 /hpf / WBC 83 /HPF   Sq Epi: x / Non Sq Epi: 2 /hpf / Bacteria: Many        MEDICATIONS  (STANDING):  cefTRIAXone   IVPB 1000 milliGRAM(s) IV Intermittent every 24 hours  influenza   Vaccine 0.5 milliLiter(s) IntraMuscular once  pantoprazole    Tablet 40 milliGRAM(s) Oral before breakfast  polyethylene glycol 3350 17 Gram(s) Oral daily  sodium chloride 0.9%. 1000 milliLiter(s) (125 mL/Hr) IV Continuous <Continuous>    MEDICATIONS  (PRN):  acetaminophen   Tablet .. 650 milliGRAM(s) Oral every 6 hours PRN Temp greater or equal to 38C (100.4F), Moderate Pain (4 - 6)      Care Discussed with Consultants/Other Providers [ ] YES  [ ] NO

## 2020-09-16 NOTE — PROVIDER CONTACT NOTE (CRITICAL VALUE NOTIFICATION) - SITUATION
Received critical result from lab blood cultures on 15th for preliminary growth aerobic growth for gram variable rods. PCR ready in 3 hours.

## 2020-09-16 NOTE — PATIENT PROFILE ADULT - HARM RISK FACTORS
Plan at discharge remains [address] [address] [facility]. No precert needed to return. COVID completed on 7/31 (-). Ambulette form on soft chart. IV Cipro q 12 continues. Lactulose continues. CM will follow.   Elvin Guzmán RN  [facility] Case Management  [phone]    yes

## 2020-09-16 NOTE — CONSULT NOTE ADULT - ASSESSMENT
35 F no pmhx p/w RLQ and fever x 2 days, had initial right flank pain 3 days ago, but then developed chills 2 days ago  Leukocytosis, fever  CT with R pyelopnephritis, urethritis  COVID PCR neg  UA positive  BCX, UCX pending  BCX with GVR, PCR neg  High risk, possible life threatening bacteremia  Overall,  1) Pyelonephritis  - CT with rads evidence for pyelonephritis, seems responding to Ceftriaxone (minimal prior abx exposure)  - Ceftriaxone 1g q 24  - F/U UCX, BCX  2) Positive culture finding  - GVR, unclear significance with PCR neg; patient clinically improved  - If clinical worsening, consider change to carbapenem  - F/U BCXs  3) Fever, leukocytosis  - Trend WBC, monitor for further fevers    Allan Ruvalcaba MD  Pager [phone]  After 5pm and on weekends call [phone]

## 2020-09-17 LAB
-  AMIKACIN: SIGNIFICANT CHANGE UP
-  AMOXICILLIN/CLAVULANIC ACID: SIGNIFICANT CHANGE UP
-  AMPICILLIN/SULBACTAM: SIGNIFICANT CHANGE UP
-  AMPICILLIN: SIGNIFICANT CHANGE UP
-  AZTREONAM: SIGNIFICANT CHANGE UP
-  CEFAZOLIN: SIGNIFICANT CHANGE UP
-  CEFEPIME: SIGNIFICANT CHANGE UP
-  CEFOXITIN: SIGNIFICANT CHANGE UP
-  CEFTRIAXONE: SIGNIFICANT CHANGE UP
-  CIPROFLOXACIN: SIGNIFICANT CHANGE UP
-  ERTAPENEM: SIGNIFICANT CHANGE UP
-  GENTAMICIN: SIGNIFICANT CHANGE UP
-  IMIPENEM: SIGNIFICANT CHANGE UP
-  LEVOFLOXACIN: SIGNIFICANT CHANGE UP
-  MEROPENEM: SIGNIFICANT CHANGE UP
-  NITROFURANTOIN: SIGNIFICANT CHANGE UP
-  PIPERACILLIN/TAZOBACTAM: SIGNIFICANT CHANGE UP
-  TIGECYCLINE: SIGNIFICANT CHANGE UP
-  TOBRAMYCIN: SIGNIFICANT CHANGE UP
-  TRIMETHOPRIM/SULFAMETHOXAZOLE: SIGNIFICANT CHANGE UP
ANION GAP SERPL CALC-SCNC: 13 MMOL/L — SIGNIFICANT CHANGE UP (ref 5–17)
BUN SERPL-MCNC: 8 MG/DL — SIGNIFICANT CHANGE UP (ref 7–23)
CALCIUM SERPL-MCNC: 8.9 MG/DL — SIGNIFICANT CHANGE UP (ref 8.4–10.5)
CHLORIDE SERPL-SCNC: 106 MMOL/L — SIGNIFICANT CHANGE UP (ref 96–108)
CO2 SERPL-SCNC: 20 MMOL/L — LOW (ref 22–31)
CREAT SERPL-MCNC: 0.69 MG/DL — SIGNIFICANT CHANGE UP (ref 0.5–1.3)
CULTURE RESULTS: SIGNIFICANT CHANGE UP
GLUCOSE SERPL-MCNC: 92 MG/DL — SIGNIFICANT CHANGE UP (ref 70–99)
HCT VFR BLD CALC: 36.8 % — SIGNIFICANT CHANGE UP (ref 34.5–45)
HGB BLD-MCNC: 12.4 G/DL — SIGNIFICANT CHANGE UP (ref 11.5–15.5)
MCHC RBC-ENTMCNC: 29.3 PG — SIGNIFICANT CHANGE UP (ref 27–34)
MCHC RBC-ENTMCNC: 33.7 GM/DL — SIGNIFICANT CHANGE UP (ref 32–36)
MCV RBC AUTO: 87 FL — SIGNIFICANT CHANGE UP (ref 80–100)
METHOD TYPE: SIGNIFICANT CHANGE UP
NRBC # BLD: 0 /100 WBCS — SIGNIFICANT CHANGE UP (ref 0–0)
ORGANISM # SPEC MICROSCOPIC CNT: SIGNIFICANT CHANGE UP
ORGANISM # SPEC MICROSCOPIC CNT: SIGNIFICANT CHANGE UP
PLATELET # BLD AUTO: 258 K/UL — SIGNIFICANT CHANGE UP (ref 150–400)
POTASSIUM SERPL-MCNC: 3.5 MMOL/L — SIGNIFICANT CHANGE UP (ref 3.5–5.3)
POTASSIUM SERPL-SCNC: 3.5 MMOL/L — SIGNIFICANT CHANGE UP (ref 3.5–5.3)
RBC # BLD: 4.23 M/UL — SIGNIFICANT CHANGE UP (ref 3.8–5.2)
RBC # FLD: 13.1 % — SIGNIFICANT CHANGE UP (ref 10.3–14.5)
SODIUM SERPL-SCNC: 139 MMOL/L — SIGNIFICANT CHANGE UP (ref 135–145)
SPECIMEN SOURCE: SIGNIFICANT CHANGE UP
WBC # BLD: 16.56 K/UL — HIGH (ref 3.8–10.5)
WBC # FLD AUTO: 16.56 K/UL — HIGH (ref 3.8–10.5)

## 2020-09-17 PROCEDURE — 99233 SBSQ HOSP IP/OBS HIGH 50: CPT

## 2020-09-17 RX ADMIN — CEFTRIAXONE 100 MILLIGRAM(S): 500 INJECTION, POWDER, FOR SOLUTION INTRAMUSCULAR; INTRAVENOUS at 22:17

## 2020-09-17 RX ADMIN — PANTOPRAZOLE SODIUM 40 MILLIGRAM(S): 20 TABLET, DELAYED RELEASE ORAL at 05:12

## 2020-09-17 NOTE — PROGRESS NOTE ADULT - SUBJECTIVE AND OBJECTIVE BOX
Patient is a 35y old  Female who presents with a chief complaint of flank pain / fever (16 Sep 2020 19:16)    no c/o    INTERVAL HPI/OVERNIGHT EVENTS:  T(C): 37.2 (09-17-20 @ 20:02), Max: 37.2 (09-17-20 @ 20:02)  HR: 92 (09-17-20 @ 20:02) (92 - 103)  BP: 121/81 (09-17-20 @ 20:02) (121/81 - 122/83)  RR: 17 (09-17-20 @ 20:02) (17 - 18)  SpO2: 98% (09-17-20 @ 20:02) (98% - 100%)  Wt(kg): --  I&O's Summary    16 Sep 2020 07:01  -  17 Sep 2020 07:00  --------------------------------------------------------  IN: 2035 mL / OUT: 1900 mL / NET: 135 mL    17 Sep 2020 07:01  -  18 Sep 2020 00:10  --------------------------------------------------------  IN: 695 mL / OUT: 2500 mL / NET: -1805 mL        PAST MEDICAL & SURGICAL HISTORY:  No pertinent past medical history    No significant past surgical history        SOCIAL HISTORY  Alcohol:  Tobacco:  Illicit substance use:    FAMILY HISTORY:    REVIEW OF SYSTEMS:  CONSTITUTIONAL: No fever, weight loss, or fatigue  EYES: No eye pain, visual disturbances, or discharge  ENMT:  No difficulty hearing, tinnitus, vertigo; No sinus or throat pain  NECK: No pain or stiffness  RESPIRATORY: No cough, wheezing, chills or hemoptysis; No shortness of breath  CARDIOVASCULAR: No chest pain, palpitations, dizziness, or leg swelling  GASTROINTESTINAL: No abdominal or epigastric pain. No nausea, vomiting, or hematemesis; No diarrhea or constipation. No melena or hematochezia.  GENITOURINARY: No dysuria, frequency, hematuria, or incontinence  NEUROLOGICAL: No headaches, memory loss, loss of strength, numbness, or tremors  SKIN: No itching, burning, rashes, or lesions   LYMPH NODES: No enlarged glands  ENDOCRINE: No heat or cold intolerance; No hair loss  MUSCULOSKELETAL: No joint pain or swelling; No muscle, back, or extremity pain  PSYCHIATRIC: No depression, anxiety, mood swings, or difficulty sleeping  HEME/LYMPH: No easy bruising, or bleeding gums  ALLERY AND IMMUNOLOGIC: No hives or eczema    RADIOLOGY & ADDITIONAL TESTS:    Imaging Personally Reviewed:  [ ] YES  [ ] NO    Consultant(s) Notes Reviewed:  [ ] YES  [ ] NO    PHYSICAL EXAM:  GENERAL: NAD, well-groomed, well-developed  HEAD:  Atraumatic, Normocephalic  EYES: EOMI, PERRLA, conjunctiva and sclera clear  ENMT: No tonsillar erythema, exudates, or enlargement; Moist mucous membranes, Good dentition, No lesions  NECK: Supple, No JVD, Normal thyroid  NERVOUS SYSTEM:  Alert & Oriented X3, Good concentration; Motor Strength 5/5 B/L upper and lower extremities; DTRs 2+ intact and symmetric  CHEST/LUNG: Clear to percussion bilaterally; No rales, rhonchi, wheezing, or rubs  HEART: Regular rate and rhythm; No murmurs, rubs, or gallops  ABDOMEN: Soft, Nontender, Nondistended; Bowel sounds present  EXTREMITIES:  2+ Peripheral Pulses, No clubbing, cyanosis, or edema  LYMPH: No lymphadenopathy noted  SKIN: No rashes or lesions    LABS:                        12.4   16.56 )-----------( 258      ( 17 Sep 2020 06:52 )             36.8     09-17    139  |  106  |  8   ----------------------------<  92  3.5   |  20<L>  |  0.69    Ca    8.9      17 Sep 2020 06:52    TPro  6.5  /  Alb  3.3  /  TBili  0.4  /  DBili  x   /  AST  20  /  ALT  24  /  AlkPhos  70  09-16        CAPILLARY BLOOD GLUCOSE                MEDICATIONS  (STANDING):  cefTRIAXone   IVPB 1000 milliGRAM(s) IV Intermittent every 24 hours  influenza   Vaccine 0.5 milliLiter(s) IntraMuscular once  pantoprazole    Tablet 40 milliGRAM(s) Oral before breakfast  polyethylene glycol 3350 17 Gram(s) Oral daily  sodium chloride 0.9%. 1000 milliLiter(s) (125 mL/Hr) IV Continuous <Continuous>    MEDICATIONS  (PRN):  acetaminophen   Tablet .. 650 milliGRAM(s) Oral every 6 hours PRN Temp greater or equal to 38C (100.4F), Moderate Pain (4 - 6)      Care Discussed with Consultants/Other Providers [ ] YES  [ ] NO

## 2020-09-17 NOTE — PROGRESS NOTE ADULT - SUBJECTIVE AND OBJECTIVE BOX
CC: F/U bacteremia    Saw/spoke to patient. No fevers, no leukocytosis. Patient doing well, asking to go home. She states plans to leave today regardless of MD recs. I specifically advised her that I would await ID and S of bacteria in blood and urine before giving formal DC recs.    Allergies  No Known Allergies    ANTIMICROBIALS:  cefTRIAXone   IVPB 1000 every 24 hours    PE:    Vital Signs Last 24 Hrs  T(C): 36.8 (17 Sep 2020 04:44), Max: 37.1 (16 Sep 2020 20:24)  T(F): 98.3 (17 Sep 2020 04:44), Max: 98.8 (16 Sep 2020 20:24)  HR: 103 (17 Sep 2020 04:44) (94 - 107)  BP: 122/83 (17 Sep 2020 04:44) (110/69 - 122/83)  RR: 18 (17 Sep 2020 04:44) (17 - 18)  SpO2: 100% (17 Sep 2020 04:44) (98% - 100%)    Gen: AOx3, NAD, non-toxic  CV: S1+S2 normal, tachycardic  Resp: Clear bilat, no resp distress, no crackles/wheezes  Abd: Soft, nontender, +BS  Ext: No LE edema, no wounds    LABS:                        12.4   16.56 )-----------( 258      ( 17 Sep 2020 06:52 )             36.8     09-17    139  |  106  |  8   ----------------------------<  92  3.5   |  20<L>  |  0.69    Ca    8.9      17 Sep 2020 06:52    TPro  6.5  /  Alb  3.3  /  TBili  0.4  /  DBili  x   /  AST  20  /  ALT  24  /  AlkPhos  70  09-16    MICROBIOLOGY:    .Blood Blood  09-16-20   No growth to date.     .Urine Clean Catch (Midstream)  09-15-20   >100,000 CFU/ml Gram Negative Rods    .Blood Blood-Peripheral  09-15-20   Growth in aerobic bottle: Gram Variable Rods    .Blood Blood-Peripheral  09-15-20   No growth to date.  --  --    (otherwise reviewed)    RADIOLOGY:    9/15 XR:    FINDINGS:  The cardiac silhouette is normal in size. There are no focal consolidations or pleural effusions. The hilar and mediastinal structures appear unremarkable. The osseous structures are intact.    IMPRESSION: Clear lungs.    9/15 CT:    IMPRESSION:    Findings suggestive of right polynephritis and proximal ureteritis.

## 2020-09-17 NOTE — PROGRESS NOTE ADULT - ATTENDING COMMENTS
clinically improve , once ID switch to oral abx , plan for d/c
once abx switch to oral , will plan for d/c

## 2020-09-17 NOTE — PROGRESS NOTE ADULT - ASSESSMENT
35 F no pmhx p/w RLQ and fever x 2 days, had initial right flank pain 3 days ago, but then developed chills 2 days ago  Leukocytosis, fever  CT with R pyelopnephritis, urethritis  COVID PCR neg  UA positive  BCX, UCX GNR  BCX with GVR, PCR neg (repeat BCX NGTD)  High risk, possible life threatening bacteremia  Overall,  1) Pyelonephritis  - CT with rads evidence for pyelonephritis, seems responding to Ceftriaxone (minimal prior abx exposure)  - Ceftriaxone 1g q 24  - F/U UCX, BCX  2) Positive culture finding  - GVR, unclear significance with PCR neg; patient clinically improved  - If clinical worsening, consider change to carbapenem  - F/U BCXs  3) Fever, leukocytosis  - Trend WBC, monitor for further fevers    I advised patient I recommend continued monitoring in the hospital, and that leaving early AMA could result in life threatening situations, or need to return to hospital for further care    Allan Ruvalcaba MD  Pager [phone]  After 5pm and on weekends call [phone]

## 2020-09-17 NOTE — PROGRESS NOTE ADULT - PROBLEM SELECTOR PLAN 1
2/2 pyelonephritis   -blood c/s pos   -tylenol for pain
2/2 pyelonephritis   -blood c/s pos   -tylenol for pain

## 2020-09-17 NOTE — PROGRESS NOTE ADULT - PROBLEM SELECTOR PROBLEM 1
Sepsis, due to unspecified organism, unspecified whether acute organ dysfunction present
Sepsis, due to unspecified organism, unspecified whether acute organ dysfunction present

## 2020-09-17 NOTE — PROGRESS NOTE ADULT - PROBLEM SELECTOR PLAN 2
started on Iv rocephine   f/u c/s   repeat CBC  seen by ID
started on Iv rocephine   f/u c/s   repeat CBC  seen by ID

## 2020-09-18 VITALS
SYSTOLIC BLOOD PRESSURE: 112 MMHG | HEART RATE: 96 BPM | DIASTOLIC BLOOD PRESSURE: 75 MMHG | OXYGEN SATURATION: 98 % | TEMPERATURE: 98 F | RESPIRATION RATE: 18 BRPM

## 2020-09-18 LAB
ANION GAP SERPL CALC-SCNC: 11 MMOL/L — SIGNIFICANT CHANGE UP (ref 5–17)
BUN SERPL-MCNC: 10 MG/DL — SIGNIFICANT CHANGE UP (ref 7–23)
CALCIUM SERPL-MCNC: 8.9 MG/DL — SIGNIFICANT CHANGE UP (ref 8.4–10.5)
CHLORIDE SERPL-SCNC: 109 MMOL/L — HIGH (ref 96–108)
CO2 SERPL-SCNC: 19 MMOL/L — LOW (ref 22–31)
CREAT SERPL-MCNC: 0.63 MG/DL — SIGNIFICANT CHANGE UP (ref 0.5–1.3)
CULTURE RESULTS: SIGNIFICANT CHANGE UP
GLUCOSE SERPL-MCNC: 81 MG/DL — SIGNIFICANT CHANGE UP (ref 70–99)
HCT VFR BLD CALC: 37.5 % — SIGNIFICANT CHANGE UP (ref 34.5–45)
HGB BLD-MCNC: 12.3 G/DL — SIGNIFICANT CHANGE UP (ref 11.5–15.5)
MCHC RBC-ENTMCNC: 28.9 PG — SIGNIFICANT CHANGE UP (ref 27–34)
MCHC RBC-ENTMCNC: 32.8 GM/DL — SIGNIFICANT CHANGE UP (ref 32–36)
MCV RBC AUTO: 88.2 FL — SIGNIFICANT CHANGE UP (ref 80–100)
NRBC # BLD: 0 /100 WBCS — SIGNIFICANT CHANGE UP (ref 0–0)
ORGANISM # SPEC MICROSCOPIC CNT: SIGNIFICANT CHANGE UP
ORGANISM # SPEC MICROSCOPIC CNT: SIGNIFICANT CHANGE UP
PLATELET # BLD AUTO: 294 K/UL — SIGNIFICANT CHANGE UP (ref 150–400)
POTASSIUM SERPL-MCNC: 3.4 MMOL/L — LOW (ref 3.5–5.3)
POTASSIUM SERPL-SCNC: 3.4 MMOL/L — LOW (ref 3.5–5.3)
RBC # BLD: 4.25 M/UL — SIGNIFICANT CHANGE UP (ref 3.8–5.2)
RBC # FLD: 13 % — SIGNIFICANT CHANGE UP (ref 10.3–14.5)
SODIUM SERPL-SCNC: 139 MMOL/L — SIGNIFICANT CHANGE UP (ref 135–145)
SPECIMEN SOURCE: SIGNIFICANT CHANGE UP
WBC # BLD: 11.34 K/UL — HIGH (ref 3.8–10.5)
WBC # FLD AUTO: 11.34 K/UL — HIGH (ref 3.8–10.5)

## 2020-09-18 PROCEDURE — 83690 ASSAY OF LIPASE: CPT

## 2020-09-18 PROCEDURE — 87186 SC STD MICRODIL/AGAR DIL: CPT

## 2020-09-18 PROCEDURE — 80048 BASIC METABOLIC PNL TOTAL CA: CPT

## 2020-09-18 PROCEDURE — 80053 COMPREHEN METABOLIC PANEL: CPT

## 2020-09-18 PROCEDURE — 93005 ELECTROCARDIOGRAM TRACING: CPT

## 2020-09-18 PROCEDURE — 85014 HEMATOCRIT: CPT

## 2020-09-18 PROCEDURE — U0003: CPT

## 2020-09-18 PROCEDURE — 87086 URINE CULTURE/COLONY COUNT: CPT

## 2020-09-18 PROCEDURE — 82947 ASSAY GLUCOSE BLOOD QUANT: CPT

## 2020-09-18 PROCEDURE — 74177 CT ABD & PELVIS W/CONTRAST: CPT

## 2020-09-18 PROCEDURE — 83605 ASSAY OF LACTIC ACID: CPT

## 2020-09-18 PROCEDURE — 82435 ASSAY OF BLOOD CHLORIDE: CPT

## 2020-09-18 PROCEDURE — 82803 BLOOD GASES ANY COMBINATION: CPT

## 2020-09-18 PROCEDURE — 85018 HEMOGLOBIN: CPT

## 2020-09-18 PROCEDURE — 81025 URINE PREGNANCY TEST: CPT

## 2020-09-18 PROCEDURE — 87150 DNA/RNA AMPLIFIED PROBE: CPT

## 2020-09-18 PROCEDURE — 85025 COMPLETE CBC W/AUTO DIFF WBC: CPT

## 2020-09-18 PROCEDURE — 81001 URINALYSIS AUTO W/SCOPE: CPT

## 2020-09-18 PROCEDURE — 99285 EMERGENCY DEPT VISIT HI MDM: CPT | Mod: 25

## 2020-09-18 PROCEDURE — 84132 ASSAY OF SERUM POTASSIUM: CPT

## 2020-09-18 PROCEDURE — 96374 THER/PROPH/DIAG INJ IV PUSH: CPT | Mod: XU

## 2020-09-18 PROCEDURE — 85027 COMPLETE CBC AUTOMATED: CPT

## 2020-09-18 PROCEDURE — 99232 SBSQ HOSP IP/OBS MODERATE 35: CPT

## 2020-09-18 PROCEDURE — 84295 ASSAY OF SERUM SODIUM: CPT

## 2020-09-18 PROCEDURE — 71045 X-RAY EXAM CHEST 1 VIEW: CPT

## 2020-09-18 PROCEDURE — 96375 TX/PRO/DX INJ NEW DRUG ADDON: CPT | Mod: XU

## 2020-09-18 PROCEDURE — 82330 ASSAY OF CALCIUM: CPT

## 2020-09-18 PROCEDURE — 87040 BLOOD CULTURE FOR BACTERIA: CPT

## 2020-09-18 PROCEDURE — 86703 HIV-1/HIV-2 1 RESULT ANTBDY: CPT

## 2020-09-18 RX ORDER — CIPROFLOXACIN LACTATE 400MG/40ML
1 VIAL (ML) INTRAVENOUS
Qty: 14 | Refills: 0
Start: 2020-09-18 | End: 2020-09-24

## 2020-09-18 RX ORDER — POTASSIUM CHLORIDE 20 MEQ
40 PACKET (EA) ORAL ONCE
Refills: 0 | Status: COMPLETED | OUTPATIENT
Start: 2020-09-18 | End: 2020-09-18

## 2020-09-18 RX ORDER — CEFPODOXIME PROXETIL 100 MG
1 TABLET ORAL
Qty: 14 | Refills: 0
Start: 2020-09-18 | End: 2020-09-24

## 2020-09-18 RX ADMIN — Medication 40 MILLIEQUIVALENT(S): at 09:06

## 2020-09-18 RX ADMIN — PANTOPRAZOLE SODIUM 40 MILLIGRAM(S): 20 TABLET, DELAYED RELEASE ORAL at 05:21

## 2020-09-18 NOTE — DISCHARGE NOTE NURSING/CASE MANAGEMENT/SOCIAL WORK - PATIENT PORTAL LINK FT
You can access the FollowMyHealth Patient Portal offered by [facility] by registering at the following website: [email or web address]. By joining [facility]’s FollowMyHealth portal, you will also be able to view your health information using other applications (apps) compatible with our system.

## 2020-09-18 NOTE — PROGRESS NOTE ADULT - ASSESSMENT
35 F no pmhx p/w RLQ and fever x 2 days, had initial right flank pain 3 days ago, but then developed chills 2 days ago  Leukocytosis, fever  CT with R pyelopnephritis, urethritis  COVID PCR neg  UA positive  BCX, UCX E coli S CTX  BCX with Bacillus, suspect contam (note repeat BCX clear)  Overall,  1) Pyelonephritis  - CT with rads evidence for pyelonephritis, good response to ceftriaxone  - Ceftriaxone 1g q 24  - When DC planning, can send on Vantin 200mg q 12 (to complete 10 day total course--including inpatient IV doses)  - F/U pending BCXs  2) Positive culture finding  - Now IDed as bacillus, suspect contam  - F/U BCXs  3) Fever, leukocytosis  - Trend WBC, monitor for further fevers    Follow up with PCP after discharge, can follow up in ID clinic PRN ([phone])    Allan Ruvalcaba MD  Pager [phone]  After 5pm and on weekends call [phone]

## 2020-09-18 NOTE — DISCHARGE NOTE PROVIDER - HOSPITAL COURSE
35F no pmhx p/w RLQ and fever x 2 days, had initial right flank pain 3 days prior to admission, but then developed chills 2 days ago. No dysuria. No vaginal discharge. LMP 8/17. Takes OCPs. No cough/ URI symptoms. Vomited x 1 just prior to admission. No headache. No rash.     CT with R pyelopnephritis, urethritis  -COVID PCR neg  -UA positive  -BCX, UCX GNR  -BCX with GVR, PCR neg (repeat BCX NGTD)    Overall,  1) Pyelonephritis  - CT with rads evidence for pyelonephritis, seems responding to Ceftriaxone (minimal prior abx exposure)  - Ceftriaxone 1g q 24 during admission  - Discharge on PO Cefpodoxime to complete 10 day course of antibiotics     DCP with medication reconciliation discussed with Dr. Beltran and Dr. Ruvalcaba.   Patient cleared for discharge home without skilled needs.   Follow up with PCP within one week.        35F no pmhx p/w RLQ and fever x 2 days, had initial right flank pain 3 days prior to admission, but then developed chills 2 days ago. No dysuria. No vaginal discharge. LMP 8/17. Takes OCPs. No cough/ URI symptoms. Vomited x 1 just prior to admission. No headache. No rash.     CT with R pyelopnephritis, urethritis  -COVID PCR neg  -UA positive  -BCX, UCX GNR  -BCX with GVR, PCR neg (repeat BCX NGTD)    Overall,  1) Pyelonephritis  - CT with rads evidence for pyelonephritis, seems responding to Ceftriaxone (minimal prior abx exposure)  - Ceftriaxone 1g q 24 during admission  - Discharge on PO Cipro to complete 10 day course of antibiotics     DCP with medication reconciliation discussed with Dr. Beltran and Dr. Ruvalcaba.   Patient cleared for discharge home without skilled needs.   Follow up with PCP within one week.

## 2020-09-18 NOTE — PROGRESS NOTE ADULT - SUBJECTIVE AND OBJECTIVE BOX
CC: F/U for Pyelo    Saw/spoke to patient. No fevers, no chills. No new complaints. Unchanged.    Allergies  No Known Allergies    ANTIMICROBIALS:  cefTRIAXone   IVPB 1000 every 24 hours    PE:    Vital Signs Last 24 Hrs  T(C): 36.6 (18 Sep 2020 12:02), Max: 37.2 (17 Sep 2020 20:02)  T(F): 97.8 (18 Sep 2020 12:02), Max: 99 (17 Sep 2020 20:02)  HR: 96 (18 Sep 2020 12:02) (92 - 96)  BP: 112/75 (18 Sep 2020 12:02) (112/73 - 121/81)  RR: 18 (18 Sep 2020 12:02) (17 - 18)  SpO2: 98% (18 Sep 2020 12:02) (97% - 98%)    Gen: AOx3, NAD, non-toxic  CV: S1+S2 normal, nontachycardic  Resp: Clear bilat, no resp distress, no crackles/wheezes  Abd: Soft, nontender, +BS  Ext: No LE edema, no wounds    LABS:                        12.3   11.34 )-----------( 294      ( 18 Sep 2020 06:21 )             37.5     09-18    139  |  109<H>  |  10  ----------------------------<  81  3.4<L>   |  19<L>  |  0.63    Ca    8.9      18 Sep 2020 06:21    MICROBIOLOGY:    .Blood Blood  09-16-20   No growth to date.     .Urine Clean Catch (Midstream)  09-15-20   >100,000 CFU/ml Escherichia coli  --  Escherichia coli    .Blood Blood-Peripheral  09-15-20   Growth in aerobic bottle: Bacillus species not anthracis    .Blood Blood-Peripheral  09-15-20   No growth to date    (otherwise reviewed)    RADIOLOGY:    9/15 CT:    FINDINGS:  LOWER CHEST: Elevated right hemidiaphragm. Partially imaged bilateral breast implants.    LIVER: Within normal limits.  BILE DUCTS: Normal caliber.  GALLBLADDER: Within normal limits.  SPLEEN: Within normal limits.  PANCREAS: Within normal limits.  ADRENALS: Within normal limits.  KIDNEYS/URETERS: Areas of low-attenuation within the right kidney with mild proximal right hydroureter and subtle right urothelial enhancement. No hydronephrosis.    BLADDER: Within normal limits.  REPRODUCTIVE ORGANS: Uterus and adnexa within normal limits.    BOWEL: No bowel obstruction. Appendix is normal.  PERITONEUM: No ascites.  VESSELS: Within normal limits.  RETROPERITONEUM/LYMPH NODES: No lymphadenopathy.  ABDOMINAL WALL: Within normal limits.  BONES: Within normal limits.    IMPRESSION:    Findings suggestive of right polynephritis and proximal ureteritis.

## 2020-09-18 NOTE — DISCHARGE NOTE PROVIDER - CARE PROVIDER_API CALL
Tiara Ha  [address] [place] [address]  Phone: [phone]  Fax: (   )    -  Follow Up Time:

## 2020-09-18 NOTE — DISCHARGE NOTE PROVIDER - NSDCMRMEDTOKEN_GEN_ALL_CORE_FT
cefpodoxime 200 mg oral tablet: 1 tab(s) orally every 12 hours   Junel Fe 1.5/30 oral tablet: 1 tab(s) orally once a day  Tylenol Extra Strength 500 mg oral tablet: 2 tab(s) orally , As Needed   ciprofloxacin 500 mg oral tablet: 1 tab(s) orally every 12 hours   Junel Fe 1.5/30 oral tablet: 1 tab(s) orally once a day  Tylenol Extra Strength 500 mg oral tablet: 2 tab(s) orally , As Needed

## 2020-09-18 NOTE — DISCHARGE NOTE PROVIDER - NSDCCPCAREPLAN_GEN_ALL_CORE_FT
PRINCIPAL DISCHARGE DIAGNOSIS  Diagnosis: Pyelonephritis  Assessment and Plan of Treatment: You had a bladder &/or kidney infection  Call your doctor with pain or burning with urination, frequent urination, feeling to urinate suddenly, blood in urine, fever, back pain, nausea or vomiting  You need to take and finish your antibiotic as prescribed so that the infection does not reoccur  Drink adequate fluids to keep your urine clear

## 2020-09-18 NOTE — DISCHARGE NOTE PROVIDER - PROVIDER TOKENS
FREE:[LAST:[Cinelli],FIRST:[Tiara],PHONE:[phone]],FAX:[(   )    -],ADDRESS:[[address] [place] [address]]]

## 2020-09-20 LAB
CULTURE RESULTS: SIGNIFICANT CHANGE UP
SPECIMEN SOURCE: SIGNIFICANT CHANGE UP

## 2020-09-21 LAB
CULTURE RESULTS: SIGNIFICANT CHANGE UP
CULTURE RESULTS: SIGNIFICANT CHANGE UP
SPECIMEN SOURCE: SIGNIFICANT CHANGE UP
SPECIMEN SOURCE: SIGNIFICANT CHANGE UP

## 2022-04-12 NOTE — ED ADULT NURSE NOTE - NS ED NURSE DISCH DISPOSITION
Received call from kimmy at [facility] with Red Flag Complaint. Subjective: Caller states \"started feeling bad over weekend gradually got worse. don't have the appetite that not me. Sick to stomach. Dry heaves at times. Head hurts. \"     Current Symptoms: pt has COPD. Patient reports her oxygen saturation is 93%, pt does not feel mor shortness of breath than baseline. Patient nausea with dry heaves. Pt reports headache. Pt denies chest pain, stomach pains, shortness of breath beyond baseline, dizziness. . Pt reports has cough productive yellow. Pt is hydrating with fluids. Sore throat     Onset: over the weekend , gradually got worse     Associated Symptoms: reduced appetite    Pain Severity: body aches 2/10; pain; intermittent  Head ache  4/10; pain; intermittent (was worse last night)     Temperature: 102 yesterday , today 100.3 forehead     What has been tried: APAP , mucinex    LMP: NA Pregnant: NA    Recommended disposition: Go to Office Now    Care advice provided, patient verbalizes understanding; denies any other questions or concerns; instructed to call back for any new or worsening symptoms. Writer provided warm transfer to Hartly at [facility] for decline in urgent in g to office now     Attention Provider: Thank you for allowing me to participate in the care of your patient. The patient was connected to triage in response to information provided to the [facility]. Please do not respond through this encounter as the response is not directed to a shared pool.       Reason for Disposition   Wheezing is present     Patient reports a wheeze    Protocols used: COUGH-ADULT-OH
Admitted

## 2022-11-16 NOTE — ED ADULT NURSE NOTE - NS ED NURSE LEVEL OF CONSCIOUSNESS MENTAL STATUS
Problem: Pain  Goal: #Acceptable pain level achieved/maintained at rest using NRS/Faces  Description: This goal is used for patients who can self-report.  Acceptable means the level is at or below the identified comfort/function goal.  Outcome: Outcome Not Met, Continue to Monitor     Problem: Activity Intolerance  Goal: # Functional status is maintained or returned to baseline  Outcome: Outcome Not Met, Continue to Monitor      Awake/Alert

## 2025-07-09 ENCOUNTER — NON-APPOINTMENT (OUTPATIENT)
Age: 40
End: 2025-07-09

## 2025-07-10 ENCOUNTER — APPOINTMENT (OUTPATIENT)
Dept: INTERNAL MEDICINE | Facility: CLINIC | Age: 40
End: 2025-07-10
Payer: MEDICAID

## 2025-07-10 ENCOUNTER — LABORATORY RESULT (OUTPATIENT)
Age: 40
End: 2025-07-10

## 2025-07-10 VITALS
OXYGEN SATURATION: 100 % | WEIGHT: 144 LBS | HEIGHT: 62 IN | SYSTOLIC BLOOD PRESSURE: 112 MMHG | BODY MASS INDEX: 26.5 KG/M2 | HEART RATE: 104 BPM | TEMPERATURE: 97.2 F | RESPIRATION RATE: 15 BRPM | DIASTOLIC BLOOD PRESSURE: 76 MMHG

## 2025-07-10 PROBLEM — Z82.5 FAMILY HISTORY OF ASTHMA: Status: ACTIVE | Noted: 2025-07-10

## 2025-07-10 PROBLEM — Z11.3 SCREENING FOR STD (SEXUALLY TRANSMITTED DISEASE): Status: ACTIVE | Noted: 2025-07-10

## 2025-07-10 PROBLEM — Z00.00 ENCOUNTER FOR PREVENTIVE HEALTH EXAMINATION: Status: ACTIVE | Noted: 2025-07-10

## 2025-07-10 PROBLEM — R30.0 DYSURIA: Status: ACTIVE | Noted: 2025-07-10

## 2025-07-10 PROCEDURE — 99385 PREV VISIT NEW AGE 18-39: CPT

## 2025-07-10 PROCEDURE — 99203 OFFICE O/P NEW LOW 30 MIN: CPT | Mod: 25

## 2025-07-10 RX ORDER — CIPROFLOXACIN HYDROCHLORIDE 500 MG/1
500 TABLET, FILM COATED ORAL
Qty: 10 | Refills: 0 | Status: ACTIVE | COMMUNITY
Start: 2025-07-10 | End: 1900-01-01

## 2025-07-11 ENCOUNTER — TRANSCRIPTION ENCOUNTER (OUTPATIENT)
Age: 40
End: 2025-07-11

## 2025-07-13 LAB
25(OH)D3 SERPL-MCNC: 37.8 NG/ML
ALBUMIN SERPL ELPH-MCNC: 4.5 G/DL
ALP BLD-CCNC: 102 U/L
ALT SERPL-CCNC: 14 U/L
ANION GAP SERPL CALC-SCNC: 15 MMOL/L
APPEARANCE: ABNORMAL
AST SERPL-CCNC: 18 U/L
BASOPHILS # BLD AUTO: 0.13 K/UL
BASOPHILS NFR BLD AUTO: 0.7 %
BILIRUB SERPL-MCNC: 0.3 MG/DL
BILIRUBIN URINE: NEGATIVE
BLOOD URINE: ABNORMAL
BUN SERPL-MCNC: 22 MG/DL
C TRACH RRNA SPEC QL NAA+PROBE: NOT DETECTED
CALCIUM SERPL-MCNC: 10.1 MG/DL
CHLORIDE SERPL-SCNC: 101 MMOL/L
CHOLEST SERPL-MCNC: 186 MG/DL
CO2 SERPL-SCNC: 23 MMOL/L
COLOR: YELLOW
CREAT SERPL-MCNC: 0.8 MG/DL
EGFRCR SERPLBLD CKD-EPI 2021: 97 ML/MIN/1.73M2
EOSINOPHIL # BLD AUTO: 0.24 K/UL
EOSINOPHIL NFR BLD AUTO: 1.3 %
ESTIMATED AVERAGE GLUCOSE: 103 MG/DL
GLUCOSE QUALITATIVE U: NEGATIVE MG/DL
GLUCOSE SERPL-MCNC: 84 MG/DL
HAV IGM SER QL: NONREACTIVE
HBA1C MFR BLD HPLC: 5.2 %
HBV CORE IGM SER QL: NONREACTIVE
HBV SURFACE AG SER QL: NONREACTIVE
HCT VFR BLD CALC: 47.6 %
HCV AB SER QL: NONREACTIVE
HCV S/CO RATIO: 0.12 S/CO
HDLC SERPL-MCNC: 80 MG/DL
HGB BLD-MCNC: 15 G/DL
HIV1+2 AB SPEC QL IA.RAPID: NONREACTIVE
HSV 1+2 IGG SER IA-IMP: NEGATIVE
HSV 1+2 IGG SER IA-IMP: POSITIVE
HSV1 IGG SER QL: 40.6 INDEX
HSV2 IGG SER QL: 0.03 INDEX
IMM GRANULOCYTES NFR BLD AUTO: 0.5 %
KETONES URINE: NEGATIVE MG/DL
LDLC SERPL-MCNC: 86 MG/DL
LEUKOCYTE ESTERASE URINE: ABNORMAL
LYMPHOCYTES # BLD AUTO: 3.53 K/UL
LYMPHOCYTES NFR BLD AUTO: 19.2 %
MAN DIFF?: NORMAL
MCHC RBC-ENTMCNC: 29.5 PG
MCHC RBC-ENTMCNC: 31.5 G/DL
MCV RBC AUTO: 93.5 FL
MONOCYTES # BLD AUTO: 1.42 K/UL
MONOCYTES NFR BLD AUTO: 7.7 %
N GONORRHOEA RRNA SPEC QL NAA+PROBE: NOT DETECTED
NEUTROPHILS # BLD AUTO: 13.02 K/UL
NEUTROPHILS NFR BLD AUTO: 70.6 %
NITRITE URINE: NEGATIVE
NONHDLC SERPL-MCNC: 106 MG/DL
PH URINE: 7
PLATELET # BLD AUTO: 339 K/UL
POTASSIUM SERPL-SCNC: 4.4 MMOL/L
PROT SERPL-MCNC: 7.6 G/DL
PROTEIN URINE: 100 MG/DL
RBC # BLD: 5.09 M/UL
RBC # FLD: 14.2 %
SODIUM SERPL-SCNC: 139 MMOL/L
SOURCE AMPLIFICATION: NORMAL
SPECIFIC GRAVITY URINE: 1.03
T PALLIDUM AB SER QL IA: NEGATIVE
TRIGL SERPL-MCNC: 116 MG/DL
TSH SERPL-ACNC: 3.47 UIU/ML
UROBILINOGEN URINE: 0.2 MG/DL
VIT B12 SERPL-MCNC: 632 PG/ML
WBC # FLD AUTO: 18.43 K/UL

## 2025-07-14 LAB — BACTERIA UR CULT: ABNORMAL

## 2025-07-15 ENCOUNTER — APPOINTMENT (OUTPATIENT)
Dept: INTERNAL MEDICINE | Facility: CLINIC | Age: 40
End: 2025-07-15
Payer: MEDICAID

## 2025-07-15 ENCOUNTER — LABORATORY RESULT (OUTPATIENT)
Age: 40
End: 2025-07-15

## 2025-07-15 VITALS
OXYGEN SATURATION: 100 % | HEIGHT: 62 IN | SYSTOLIC BLOOD PRESSURE: 116 MMHG | WEIGHT: 144 LBS | BODY MASS INDEX: 26.5 KG/M2 | DIASTOLIC BLOOD PRESSURE: 78 MMHG | HEART RATE: 79 BPM | RESPIRATION RATE: 14 BRPM

## 2025-07-15 PROBLEM — R05.9 COUGH: Status: ACTIVE | Noted: 2025-07-15

## 2025-07-15 PROBLEM — J02.9 SORE THROAT: Status: ACTIVE | Noted: 2025-07-15 | Resolved: 2025-08-14

## 2025-07-15 PROBLEM — N39.0 UTI (URINARY TRACT INFECTION): Status: ACTIVE | Noted: 2025-07-15 | Resolved: 2025-08-14

## 2025-07-15 PROBLEM — R09.82 PND (POST-NASAL DRIP): Status: ACTIVE | Noted: 2025-07-15

## 2025-07-15 PROBLEM — R79.89 ABNORMAL CBC: Status: ACTIVE | Noted: 2025-07-15

## 2025-07-15 PROCEDURE — 99214 OFFICE O/P EST MOD 30 MIN: CPT

## 2025-07-15 RX ORDER — BENZONATATE 200 MG/1
200 CAPSULE ORAL
Qty: 30 | Refills: 0 | Status: ACTIVE | COMMUNITY
Start: 2025-07-15 | End: 1900-01-01

## 2025-07-15 RX ORDER — FLUTICASONE PROPIONATE 50 UG/1
50 SPRAY NASAL
Qty: 1 | Refills: 3 | Status: ACTIVE | COMMUNITY
Start: 2025-07-15 | End: 1900-01-01

## 2025-07-22 LAB
APPEARANCE: CLEAR
BASOPHILS # BLD AUTO: 0.08 K/UL
BASOPHILS NFR BLD AUTO: 0.8 %
BILIRUBIN URINE: NEGATIVE
BLOOD URINE: NEGATIVE
COLOR: NORMAL
EOSINOPHIL # BLD AUTO: 0.24 K/UL
EOSINOPHIL NFR BLD AUTO: 2.3 %
GLUCOSE QUALITATIVE U: NEGATIVE MG/DL
HCT VFR BLD CALC: 45.6 %
HGB BLD-MCNC: 14.9 G/DL
IMM GRANULOCYTES NFR BLD AUTO: 0.5 %
INFLUENZA A RESULT: NOT DETECTED
INFLUENZA B RESULT: NOT DETECTED
KETONES URINE: NEGATIVE MG/DL
LEUKOCYTE ESTERASE URINE: ABNORMAL
LYMPHOCYTES # BLD AUTO: 3.22 K/UL
LYMPHOCYTES NFR BLD AUTO: 30.5 %
MAN DIFF?: NORMAL
MCHC RBC-ENTMCNC: 28.3 PG
MCHC RBC-ENTMCNC: 32.7 G/DL
MCV RBC AUTO: 86.5 FL
MONOCYTES # BLD AUTO: 0.77 K/UL
MONOCYTES NFR BLD AUTO: 7.3 %
NEUTROPHILS # BLD AUTO: 6.19 K/UL
NEUTROPHILS NFR BLD AUTO: 58.6 %
NITRITE URINE: NEGATIVE
PH URINE: 5.5
PLATELET # BLD AUTO: 315 K/UL
PROTEIN URINE: NORMAL MG/DL
RBC # BLD: 5.27 M/UL
RBC # FLD: 13.4 %
RESP SYN VIRUS RESULT: NOT DETECTED
SARS-COV-2 RESULT: NOT DETECTED
SPECIFIC GRAVITY URINE: 1.03
UROBILINOGEN URINE: 0.2 MG/DL
WBC # FLD AUTO: 10.55 K/UL